# Patient Record
Sex: FEMALE | Race: WHITE | NOT HISPANIC OR LATINO | Employment: FULL TIME | URBAN - METROPOLITAN AREA
[De-identification: names, ages, dates, MRNs, and addresses within clinical notes are randomized per-mention and may not be internally consistent; named-entity substitution may affect disease eponyms.]

---

## 2024-02-10 ENCOUNTER — PHARMACY VISIT (OUTPATIENT)
Dept: PHARMACY | Facility: CLINIC | Age: 24
End: 2024-02-10

## 2024-02-10 ENCOUNTER — OFFICE VISIT (OUTPATIENT)
Dept: URGENT CARE | Facility: CLINIC | Age: 24
End: 2024-02-10
Payer: COMMERCIAL

## 2024-02-10 VITALS
SYSTOLIC BLOOD PRESSURE: 140 MMHG | DIASTOLIC BLOOD PRESSURE: 95 MMHG | BODY MASS INDEX: 53.92 KG/M2 | TEMPERATURE: 98.6 F | HEART RATE: 87 BPM | OXYGEN SATURATION: 96 % | WEIGHT: 293 LBS | HEIGHT: 62 IN

## 2024-02-10 DIAGNOSIS — J20.9 ACUTE BRONCHITIS, UNSPECIFIED ORGANISM: Primary | ICD-10-CM

## 2024-02-10 PROCEDURE — 99213 OFFICE O/P EST LOW 20 MIN: CPT | Mod: 25,27 | Performed by: NURSE PRACTITIONER

## 2024-02-10 PROCEDURE — RXMED WILLOW AMBULATORY MEDICATION CHARGE

## 2024-02-10 RX ORDER — ALBUTEROL SULFATE 90 UG/1
AEROSOL, METERED RESPIRATORY (INHALATION)
COMMUNITY
Start: 2024-01-12 | End: 2024-04-02 | Stop reason: ALTCHOICE

## 2024-02-10 RX ORDER — AZITHROMYCIN 250 MG/1
TABLET, FILM COATED ORAL
Qty: 6 TABLET | Refills: 0 | Status: SHIPPED | OUTPATIENT
Start: 2024-02-10 | End: 2024-02-15

## 2024-02-10 RX ORDER — PREDNISONE 10 MG/1
TABLET ORAL
Qty: 21 TABLET | Refills: 0 | Status: SHIPPED | OUTPATIENT
Start: 2024-02-10 | End: 2024-02-16

## 2024-02-10 NOTE — PROGRESS NOTES
23 y.o. female presents for evaluation of cough that has been present for the past 2 months. States she was treated for bronchitis x 2 which symptoms did improve each time but never completely resolved. Denies fever, chest pains, n/v/d, body aches or any other associated symptoms. Does have fatigue and SOB intermittently. No otc meds for symptoms. Does have albuterol inhaler that she has been using without spacer. Declines testing today.      Vitals:    02/10/24 0943   BP: (!) 140/95   Pulse: 87   Temp: 37 °C (98.6 °F)   SpO2: 96%       Allergies   Allergen Reactions    Penicillins Rash and Runny nose    Bee Pollen Itching    Horse Dander Standardized Allergenic Extract Itching    House Dust Mite Other    Sulfa (Sulfonamide Antibiotics) Rash       Medication Documentation Review Audit       Reviewed by Annalise Ribeiro MA (Medical Assistant) on 02/10/24 at 0948      Medication Order Taking? Sig Documenting Provider Last Dose Status   albuterol 90 mcg/actuation inhaler [id number] Yes  Historical Provider, MD  Active                    Past Medical History:   Diagnosis Date    Encounter for gynecological examination (general) (routine) without abnormal findings 01/13/2021    Women's annual routine gynecological examination    Other conditions influencing health status     Menstruation       Past Surgical History:   Procedure Laterality Date    OTHER SURGICAL HISTORY  12/12/2019    Tonsillectomy with adenoidectomy    OTHER SURGICAL HISTORY  01/26/2022    [place] tooth extraction    OTHER SURGICAL HISTORY  01/28/2021    Colonoscopy       ROS  See HPI    Physical Exam  Vitals and nursing note reviewed.   Constitutional:       General: She is not in acute distress.     Appearance: Normal appearance. She is normal weight. She is not ill-appearing, toxic-appearing or diaphoretic.   HENT:      Head: Normocephalic and atraumatic.      Right Ear: Tympanic membrane and ear canal normal.      Left Ear: Tympanic membrane and ear  canal normal.      Nose: Congestion present.      Mouth/Throat:      Mouth: Mucous membranes are moist.      Pharynx: Oropharynx is clear.   Eyes:      Extraocular Movements: Extraocular movements intact.      Conjunctiva/sclera: Conjunctivae normal.      Pupils: Pupils are equal, round, and reactive to light.   Cardiovascular:      Rate and Rhythm: Normal rate and regular rhythm.   Pulmonary:      Effort: Pulmonary effort is normal. No respiratory distress.      Breath sounds: Normal breath sounds. No stridor. No wheezing, rhonchi or rales.   Lymphadenopathy:      Cervical: No cervical adenopathy.   Skin:     General: Skin is warm and dry.      Capillary Refill: Capillary refill takes less than 2 seconds.   Neurological:      General: No focal deficit present.      Mental Status: She is alert and oriented to person, place, and time.   Psychiatric:         Mood and Affect: Mood normal.         Behavior: Behavior normal.         Assessment/Plan/MDM  Lynne was seen today for uri.  Diagnoses and all orders for this visit:  Acute bronchitis, unspecified organism (Primary)  -     azithromycin (Zithromax Z-Raciel) 250 mg tablet; Take 2 tablets (500 mg) on  Day 1,  followed by 1 tablet (250 mg) once daily on Days 2 through 5.  -     predniSONE (Deltasone) 10 mg tablet; Take 6 tablets (60 mg) by mouth once daily for 1 day, THEN 5 tablets (50 mg) once daily for 1 day, THEN 4 tablets (40 mg) once daily for 1 day, THEN 3 tablets (30 mg) once daily for 1 day, THEN 2 tablets (20 mg) once daily for 1 day, THEN 1 tablet (10 mg) once daily for 1 day.    Spacer provided for use with inhaler.  Patient's clinical presentation is otherwise unremarkable at this time. Patient is discharged with instructions to follow-up with primary care or seek emergency medical attention for worsening symptoms or any new concerns.        Richard Uribe, CNP  [facility]  [phone]

## 2024-04-02 ENCOUNTER — TELEPHONE (OUTPATIENT)
Dept: PRIMARY CARE | Facility: CLINIC | Age: 24
End: 2024-04-02

## 2024-04-02 ENCOUNTER — LAB (OUTPATIENT)
Dept: LAB | Facility: LAB | Age: 24
End: 2024-04-02
Payer: COMMERCIAL

## 2024-04-02 ENCOUNTER — OFFICE VISIT (OUTPATIENT)
Dept: PRIMARY CARE | Facility: CLINIC | Age: 24
End: 2024-04-02
Payer: COMMERCIAL

## 2024-04-02 VITALS
HEART RATE: 85 BPM | HEIGHT: 62 IN | BODY MASS INDEX: 53.92 KG/M2 | WEIGHT: 293 LBS | SYSTOLIC BLOOD PRESSURE: 126 MMHG | DIASTOLIC BLOOD PRESSURE: 82 MMHG

## 2024-04-02 DIAGNOSIS — E66.01 OBESITY, MORBID, BMI 50 OR HIGHER (MULTI): ICD-10-CM

## 2024-04-02 DIAGNOSIS — R63.5 WEIGHT GAIN: Primary | ICD-10-CM

## 2024-04-02 DIAGNOSIS — Z00.00 WELLNESS EXAMINATION: ICD-10-CM

## 2024-04-02 DIAGNOSIS — R63.5 WEIGHT GAIN: ICD-10-CM

## 2024-04-02 DIAGNOSIS — E66.01 OBESITY, MORBID, BMI 50 OR HIGHER (MULTI): Primary | ICD-10-CM

## 2024-04-02 LAB
ALBUMIN SERPL BCP-MCNC: 4.3 G/DL (ref 3.4–5)
ALP SERPL-CCNC: 75 U/L (ref 33–110)
ALT SERPL W P-5'-P-CCNC: 41 U/L (ref 7–45)
ANION GAP SERPL CALC-SCNC: 10 MMOL/L (ref 10–20)
AST SERPL W P-5'-P-CCNC: 29 U/L (ref 9–39)
BILIRUB SERPL-MCNC: 0.5 MG/DL (ref 0–1.2)
BUN SERPL-MCNC: 13 MG/DL (ref 6–23)
CALCIUM SERPL-MCNC: 9.1 MG/DL (ref 8.6–10.3)
CHLORIDE SERPL-SCNC: 105 MMOL/L (ref 98–107)
CHOLEST SERPL-MCNC: 238 MG/DL (ref 0–199)
CHOLESTEROL/HDL RATIO: 4.9
CO2 SERPL-SCNC: 28 MMOL/L (ref 21–32)
CREAT SERPL-MCNC: 0.7 MG/DL (ref 0.5–1.05)
EGFRCR SERPLBLD CKD-EPI 2021: >90 ML/MIN/1.73M*2
EST. AVERAGE GLUCOSE BLD GHB EST-MCNC: 103 MG/DL
FSH SERPL-ACNC: 6 IU/L
GLUCOSE SERPL-MCNC: 96 MG/DL (ref 74–99)
HBA1C MFR BLD: 5.2 %
HDLC SERPL-MCNC: 49 MG/DL
INSULIN SERPL-ACNC: 25 UIU/ML (ref 3–25)
LDLC SERPL CALC-MCNC: 166 MG/DL
LH SERPL-ACNC: 3.3 IU/L
NON HDL CHOLESTEROL: 189 MG/DL (ref 0–149)
POTASSIUM SERPL-SCNC: 4.3 MMOL/L (ref 3.5–5.3)
PROT SERPL-MCNC: 6.5 G/DL (ref 6.4–8.2)
SODIUM SERPL-SCNC: 139 MMOL/L (ref 136–145)
TRIGL SERPL-MCNC: 116 MG/DL (ref 0–149)
TSH SERPL-ACNC: 1.63 MIU/L (ref 0.44–3.98)
VLDL: 23 MG/DL (ref 0–40)

## 2024-04-02 PROCEDURE — 83001 ASSAY OF GONADOTROPIN (FSH): CPT

## 2024-04-02 PROCEDURE — 80053 COMPREHEN METABOLIC PANEL: CPT

## 2024-04-02 PROCEDURE — 84443 ASSAY THYROID STIM HORMONE: CPT

## 2024-04-02 PROCEDURE — 83036 HEMOGLOBIN GLYCOSYLATED A1C: CPT

## 2024-04-02 PROCEDURE — 83002 ASSAY OF GONADOTROPIN (LH): CPT

## 2024-04-02 PROCEDURE — 80061 LIPID PANEL: CPT

## 2024-04-02 PROCEDURE — RXMED WILLOW AMBULATORY MEDICATION CHARGE

## 2024-04-02 PROCEDURE — 99395 PREV VISIT EST AGE 18-39: CPT | Performed by: INTERNAL MEDICINE

## 2024-04-02 PROCEDURE — 36415 COLL VENOUS BLD VENIPUNCTURE: CPT

## 2024-04-02 PROCEDURE — 82626 DEHYDROEPIANDROSTERONE: CPT

## 2024-04-02 PROCEDURE — 1036F TOBACCO NON-USER: CPT | Performed by: INTERNAL MEDICINE

## 2024-04-02 PROCEDURE — 83525 ASSAY OF INSULIN: CPT

## 2024-04-02 RX ORDER — BUPROPION HYDROCHLORIDE 150 MG/1
150 TABLET ORAL EVERY MORNING
Qty: 30 TABLET | Refills: 5 | Status: SHIPPED | OUTPATIENT
Start: 2024-04-02 | End: 2024-09-29

## 2024-04-02 ASSESSMENT — ENCOUNTER SYMPTOMS
UNEXPECTED WEIGHT CHANGE: 1
WHEEZING: 0
ACTIVITY CHANGE: 0
BACK PAIN: 0
CHILLS: 0
ABDOMINAL DISTENTION: 0
SINUS PAIN: 0
COUGH: 0
VOMITING: 0
WEAKNESS: 0
NUMBNESS: 0
SORE THROAT: 0
FATIGUE: 0
SHORTNESS OF BREATH: 0
SINUS PRESSURE: 0
NAUSEA: 0
APPETITE CHANGE: 0
DIARRHEA: 0

## 2024-04-02 ASSESSMENT — PATIENT HEALTH QUESTIONNAIRE - PHQ9
2. FEELING DOWN, DEPRESSED OR HOPELESS: SEVERAL DAYS
1. LITTLE INTEREST OR PLEASURE IN DOING THINGS: SEVERAL DAYS
SUM OF ALL RESPONSES TO PHQ9 QUESTIONS 1 AND 2: 2

## 2024-04-02 NOTE — PROGRESS NOTES
"Subjective   Patient ID: Lynne Hernandez is a 23 y.o. female who presents for Annual Exam.  HPI  Patient is here today for annual exam.   Pt reports that she has been struggling to loose weight, her  has been doing the same diet that she is and he has lost 80 lbs and she has gained 10 lbs.   Seen Dr Beaulieu and tried things to get pregnant without success.   She said gyn told her that she does not have pcos.   Her periods are irregular and heavy.     Review of Systems   Constitutional:  Positive for unexpected weight change. Negative for activity change, appetite change, chills and fatigue.   HENT:  Negative for congestion, postnasal drip, sinus pressure, sinus pain and sore throat.    Respiratory:  Negative for cough, shortness of breath and wheezing.    Cardiovascular:  Negative for chest pain and leg swelling.   Gastrointestinal:  Negative for abdominal distention, diarrhea, nausea and vomiting.   Musculoskeletal:  Negative for back pain.   Neurological:  Negative for weakness and numbness.       Objective   /82   Pulse 85   Ht 1.58 m (5' 2.21\")   Wt 146 kg (321 lb)   BMI 58.32 kg/m²     Physical Exam  Constitutional:       General: She is not in acute distress.     Appearance: Normal appearance.   HENT:      Head: Normocephalic.      Nose: Nose normal.      Mouth/Throat:      Pharynx: No oropharyngeal exudate.   Eyes:      General:         Right eye: No discharge.         Left eye: No discharge.      Extraocular Movements: Extraocular movements intact.      Pupils: Pupils are equal, round, and reactive to light.   Cardiovascular:      Rate and Rhythm: Normal rate and regular rhythm.      Heart sounds: No murmur heard.     No gallop.   Pulmonary:      Effort: Pulmonary effort is normal. No respiratory distress.      Breath sounds: Normal breath sounds. No wheezing.   Musculoskeletal:         General: No swelling. Normal range of motion.   Skin:     General: Skin is warm and dry.      " Coloration: Skin is not jaundiced.   Neurological:      General: No focal deficit present.      Mental Status: She is alert and oriented to person, place, and time.      Cranial Nerves: No cranial nerve deficit.   Psychiatric:         Mood and Affect: Mood normal.         Behavior: Behavior normal.         Immunizations   Flu shot 2022   COVID declines   PNA --   Shingles --   RSV --     Pap 2020, 2023   Mammo --   DeXA --   Colon cancer --     Assessment/Plan   Problem List Items Addressed This Visit       Weight gain - Primary    Relevant Orders    TSH with reflex to Free T4 if abnormal    Insulin, random    DHEA    Hemoglobin A1C    FSH    Luteinizing hormone    Wellness examination    Relevant Orders    Comprehensive Metabolic Panel    Lipid Panel    Obesity, morbid, BMI 50 or higher (CMS/HCC)    Relevant Medications    tirzepatide, weight loss, (Zepbound) 2.5 mg/0.5 mL injection     Obesity, bmi 58  - will repeat labs and see If she has PCOS   - will send in zepbound and see if she has any coverage for weight loss meds   - discussed other oral options       Final diagnoses:   [R63.5] Weight gain   [Z00.00] Wellness examination   [E66.01] Obesity, morbid, BMI 50 or higher (CMS/HCC)

## 2024-04-06 LAB — DHEA SERPL-MCNC: 2 NG/ML (ref 1.33–7.78)

## 2024-04-13 ENCOUNTER — PHARMACY VISIT (OUTPATIENT)
Dept: PHARMACY | Facility: CLINIC | Age: 24
End: 2024-04-13
Payer: COMMERCIAL

## 2024-04-13 ENCOUNTER — HOSPITAL ENCOUNTER (EMERGENCY)
Facility: HOSPITAL | Age: 24
Discharge: HOME | End: 2024-04-13
Payer: COMMERCIAL

## 2024-04-13 VITALS
HEART RATE: 87 BPM | WEIGHT: 293 LBS | DIASTOLIC BLOOD PRESSURE: 88 MMHG | RESPIRATION RATE: 18 BRPM | SYSTOLIC BLOOD PRESSURE: 196 MMHG | BODY MASS INDEX: 53.92 KG/M2 | OXYGEN SATURATION: 97 % | TEMPERATURE: 98.6 F | HEIGHT: 62 IN

## 2024-04-13 DIAGNOSIS — L23.2 ALLERGIC CONTACT DERMATITIS DUE TO COSMETICS: Primary | ICD-10-CM

## 2024-04-13 PROCEDURE — 99283 EMERGENCY DEPT VISIT LOW MDM: CPT

## 2024-04-13 PROCEDURE — RXMED WILLOW AMBULATORY MEDICATION CHARGE

## 2024-04-13 PROCEDURE — 2500000004 HC RX 250 GENERAL PHARMACY W/ HCPCS (ALT 636 FOR OP/ED): Performed by: PHYSICIAN ASSISTANT

## 2024-04-13 PROCEDURE — 2500000001 HC RX 250 WO HCPCS SELF ADMINISTERED DRUGS (ALT 637 FOR MEDICARE OP): Performed by: PHYSICIAN ASSISTANT

## 2024-04-13 RX ORDER — DIPHENHYDRAMINE HCL 25 MG
25 TABLET ORAL EVERY 6 HOURS PRN
Qty: 30 TABLET | Refills: 0 | Status: SHIPPED | OUTPATIENT
Start: 2024-04-13

## 2024-04-13 RX ORDER — FAMOTIDINE 20 MG/1
40 TABLET, FILM COATED ORAL ONCE
Status: COMPLETED | OUTPATIENT
Start: 2024-04-13 | End: 2024-04-13

## 2024-04-13 RX ORDER — PREDNISONE 20 MG/1
40 TABLET ORAL ONCE
Status: COMPLETED | OUTPATIENT
Start: 2024-04-13 | End: 2024-04-13

## 2024-04-13 RX ORDER — PREDNISONE 10 MG/1
40 TABLET ORAL DAILY
Qty: 16 TABLET | Refills: 0 | Status: SHIPPED | OUTPATIENT
Start: 2024-04-13 | End: 2024-04-17

## 2024-04-13 RX ORDER — FAMOTIDINE 20 MG/1
40 TABLET, FILM COATED ORAL DAILY
Qty: 10 TABLET | Refills: 0 | Status: SHIPPED | OUTPATIENT
Start: 2024-04-13 | End: 2024-04-18

## 2024-04-13 RX ADMIN — FAMOTIDINE 40 MG: 20 TABLET, FILM COATED ORAL at 10:31

## 2024-04-13 RX ADMIN — PREDNISONE 40 MG: 20 TABLET ORAL at 10:31

## 2024-04-13 ASSESSMENT — PAIN DESCRIPTION - LOCATION: LOCATION: HEAD

## 2024-04-13 ASSESSMENT — PAIN DESCRIPTION - PAIN TYPE: TYPE: ACUTE PAIN

## 2024-04-13 ASSESSMENT — COLUMBIA-SUICIDE SEVERITY RATING SCALE - C-SSRS
1. IN THE PAST MONTH, HAVE YOU WISHED YOU WERE DEAD OR WISHED YOU COULD GO TO SLEEP AND NOT WAKE UP?: NO
2. HAVE YOU ACTUALLY HAD ANY THOUGHTS OF KILLING YOURSELF?: NO
6. HAVE YOU EVER DONE ANYTHING, STARTED TO DO ANYTHING, OR PREPARED TO DO ANYTHING TO END YOUR LIFE?: NO

## 2024-04-13 ASSESSMENT — PAIN - FUNCTIONAL ASSESSMENT: PAIN_FUNCTIONAL_ASSESSMENT: 0-10

## 2024-04-13 ASSESSMENT — PAIN SCALES - GENERAL: PAINLEVEL_OUTOF10: 2

## 2024-04-13 NOTE — ED PROVIDER NOTES
HPI   Chief Complaint   Patient presents with    Allergic Reaction     Allergic reaction to hair dye. Had hair done on Wednesday and now has facial swelling. Throat feels irritated, no signs of airway edema       Patient presents with scalp itching after using hair dye.  States she dyed her hair to 3 days ago.  Started developing itching and redness.  She denies any lesions.  She tried over-the-counter Benadryl with some relief.  States this happened prior with other hair dyes.  Patient denies any throat swelling or tongue swelling.  No trouble breathing.  No near syncope.  No chest pain or shortness of breath      History provided by:  Patient                      Aravind Coma Scale Score: 15                     Patient History   Past Medical History:   Diagnosis Date    Encounter for gynecological examination (general) (routine) without abnormal findings 01/13/2021    Women's annual routine gynecological examination    Other conditions influencing health status     Menstruation     Past Surgical History:   Procedure Laterality Date    OTHER SURGICAL HISTORY  12/12/2019    Tonsillectomy with adenoidectomy    OTHER SURGICAL HISTORY  01/26/2022    [place] tooth extraction    OTHER SURGICAL HISTORY  01/28/2021    Colonoscopy     No family history on file.  Social History     Tobacco Use    Smoking status: Never    Smokeless tobacco: Never   Substance Use Topics    Alcohol use: Not Currently    Drug use: Never       Physical Exam   ED Triage Vitals [04/13/24 1016]   Temperature Heart Rate Respirations BP   37 °C (98.6 °F) 87 18 (!) 196/88      Pulse Ox Temp Source Heart Rate Source Patient Position   97 % Temporal -- --      BP Location FiO2 (%)     -- --       Physical Exam  Vitals and nursing note reviewed.   Constitutional:       General: She is not in acute distress.     Appearance: Normal appearance. She is well-developed and well-groomed. She is obese. She is not ill-appearing or toxic-appearing.   HENT:       Head: Normocephalic.        Right Ear: Ear canal and external ear normal.      Left Ear: Ear canal and external ear normal.      Nose: Nose normal.      Mouth/Throat:      Lips: Pink. No lesions.      Mouth: Mucous membranes are moist.      Pharynx: No oropharyngeal exudate or posterior oropharyngeal erythema.   Eyes:      General: No scleral icterus.     Conjunctiva/sclera: Conjunctivae normal.   Cardiovascular:      Rate and Rhythm: Normal rate and regular rhythm.      Heart sounds: Normal heart sounds.   Pulmonary:      Effort: Pulmonary effort is normal.      Breath sounds: Normal breath sounds and air entry.   Skin:     Capillary Refill: Capillary refill takes less than 2 seconds.   Neurological:      General: No focal deficit present.      Mental Status: She is alert and oriented to person, place, and time.      Cranial Nerves: No cranial nerve deficit or facial asymmetry.      Sensory: No sensory deficit.      Motor: No weakness.      Gait: Gait normal.   Psychiatric:         Attention and Perception: Attention and perception normal.         Mood and Affect: Mood and affect normal.         Speech: Speech normal.         Behavior: Behavior normal. Behavior is cooperative.         Thought Content: Thought content normal.         Cognition and Memory: Cognition and memory normal.         Judgment: Judgment normal.         ED Course & MDM   Diagnoses as of 04/13/24 1031   Allergic contact dermatitis due to cosmetics       Medical Decision Making  Patient presents with scalp itching after using hair dye.  States she dyed her hair to 3 days ago.  Started developing itching and redness.  She denies any lesions.  She tried over-the-counter Benadryl with some relief.  States this happened prior with other hair dyes.  Patient denies any throat swelling or tongue swelling.  No trouble breathing.  No near syncope.  No chest pain or shortness of breath    Ddx: Contact dermatitis, irritant, other    Will treat patient with  steroids and Pepcid.  She took Benadryl prior to arrival.  Prescriptions for home for 4 additional days of prednisone with Pepcid and continue Benadryl.  Patient encouraged to discontinue using these types of dyes.  Patient encouraged to follow-up with primary care provider within the next 1 to 2 days for follow-up care.  Patient discharged home in improved stable condition    Problems Addressed:  Allergic contact dermatitis due to cosmetics: undiagnosed new problem with uncertain prognosis     Details: Treatment with steroids Pepcid and Benadryl.  First dose given in the ED    Risk  OTC drugs.  Prescription drug management.  Diagnosis or treatment significantly limited by social determinants of health.        Procedure  Procedures     Andreina Oliveira PA-C  04/13/24 1030

## 2024-04-15 ENCOUNTER — PHARMACY VISIT (OUTPATIENT)
Dept: PHARMACY | Facility: CLINIC | Age: 24
End: 2024-04-15
Payer: COMMERCIAL

## 2024-04-15 PROCEDURE — RXMED WILLOW AMBULATORY MEDICATION CHARGE

## 2024-04-15 RX ORDER — PREDNISONE 20 MG/1
TABLET ORAL
Qty: 16 TABLET | Refills: 0 | OUTPATIENT
Start: 2024-04-15

## 2024-04-15 RX ORDER — LORATADINE 10 MG/1
TABLET ORAL
Qty: 14 TABLET | Refills: 0 | OUTPATIENT
Start: 2024-04-15

## 2024-04-19 ENCOUNTER — LAB (OUTPATIENT)
Dept: LAB | Facility: LAB | Age: 24
End: 2024-04-19
Payer: COMMERCIAL

## 2024-04-19 DIAGNOSIS — J30.1 ALLERGIC RHINITIS DUE TO POLLEN: Primary | ICD-10-CM

## 2024-04-19 LAB — IGE SERPL-ACNC: 21 IU/ML (ref 0–214)

## 2024-04-19 PROCEDURE — 86003 ALLG SPEC IGE CRUDE XTRC EA: CPT

## 2024-04-19 PROCEDURE — 36415 COLL VENOUS BLD VENIPUNCTURE: CPT

## 2024-04-19 PROCEDURE — 82785 ASSAY OF IGE: CPT

## 2024-04-20 LAB
CLAM IGE QN: <0.1 KU/L
CODFISH IGE QN: <0.1 KU/L
CORN IGE QN: <0.1
EGG WHITE IGE QN: <0.1 KU/L
MILK IGE QN: 0.13 KU/L
PEANUT IGE QN: <0.1 KU/L
SCALLOP IGE QN: <0.1 KU/L
SESAME SEED IGE QN: <0.1 KU/L
SHRIMP IGE QN: 0.5 KU/L
SOYBEAN IGE QN: <0.1 KU/L
WALNUT IGE QN: <0.1 KU/L
WHEAT IGE QN: <0.1 KU/L

## 2024-04-25 ENCOUNTER — TELEMEDICINE (OUTPATIENT)
Dept: PRIMARY CARE | Facility: CLINIC | Age: 24
End: 2024-04-25
Payer: COMMERCIAL

## 2024-04-25 DIAGNOSIS — T78.40XS ALLERGIC REACTION, SEQUELA: Primary | ICD-10-CM

## 2024-04-25 PROBLEM — T78.40XA ALLERGIC REACTION: Status: ACTIVE | Noted: 2024-04-25

## 2024-04-25 PROCEDURE — 99213 OFFICE O/P EST LOW 20 MIN: CPT | Performed by: INTERNAL MEDICINE

## 2024-04-25 PROCEDURE — 1036F TOBACCO NON-USER: CPT | Performed by: INTERNAL MEDICINE

## 2024-04-25 ASSESSMENT — ENCOUNTER SYMPTOMS
SORE THROAT: 0
SINUS PRESSURE: 0
NUMBNESS: 0
FATIGUE: 0
WHEEZING: 0
ACTIVITY CHANGE: 0
WEAKNESS: 0
APPETITE CHANGE: 0
COUGH: 0
SHORTNESS OF BREATH: 0
SINUS PAIN: 0
ABDOMINAL DISTENTION: 0
VOMITING: 0
CHILLS: 0
BACK PAIN: 0
ROS SKIN COMMENTS: +ITCHING
NAUSEA: 0
DIARRHEA: 0

## 2024-04-25 NOTE — PROGRESS NOTES
Subjective   Patient ID: Lynne Hernandez is a 23 y.o. female who presents for No chief complaint on file..  HPI  Patient is here today for ED follow up    Pt states that she got her hair dyed, she was itching that NIGHT, she normally reacts to a lot of things, she then had swelling in the back of her head and then it extended into her face with eye swelling. She was given antihistamine, pepcid and prednisone. Two days later she had such severe selling that she felt like she was having trouble breathing and her eyes were really swollen. She went back to the ED and she was given a steroid injection and increased her steroid dosing. She states that it took the rest of that week for most of the swelling to resolve. Now having congestion still. She finished the prednisone. Rash has resolved.     Review of Systems   Constitutional:  Negative for activity change, appetite change, chills and fatigue.   HENT:  Negative for congestion, postnasal drip, sinus pressure, sinus pain and sore throat.    Respiratory:  Negative for cough, shortness of breath and wheezing.    Cardiovascular:  Negative for chest pain and leg swelling.   Gastrointestinal:  Negative for abdominal distention, diarrhea, nausea and vomiting.   Musculoskeletal:  Negative for back pain.   Skin:         +itching    Neurological:  Negative for weakness and numbness.       Objective   LMP 04/01/2024 (Approximate)     Physical Exam  No physical exam was completed due to virtual visit.     Assessment/Plan   Problem List Items Addressed This Visit       Allergic reaction - Primary     Allergic reaction to hair dye   - had significant swelling, angioedema and rash   - finished prednisone   - advised adding flonase to claritin   - did see ENT and discussing allergy shots     Final diagnoses:   [T78.40XS] Allergic reaction, sequela

## 2024-05-15 ENCOUNTER — APPOINTMENT (OUTPATIENT)
Dept: PRIMARY CARE | Facility: CLINIC | Age: 24
End: 2024-05-15
Payer: COMMERCIAL

## 2024-07-17 ENCOUNTER — APPOINTMENT (OUTPATIENT)
Dept: PRIMARY CARE | Facility: CLINIC | Age: 24
End: 2024-07-17
Payer: COMMERCIAL

## 2024-09-03 ENCOUNTER — PHARMACY VISIT (OUTPATIENT)
Dept: PHARMACY | Facility: CLINIC | Age: 24
End: 2024-09-03
Payer: COMMERCIAL

## 2024-09-03 ENCOUNTER — TELEPHONE (OUTPATIENT)
Dept: URGENT CARE | Facility: CLINIC | Age: 24
End: 2024-09-03

## 2024-09-03 ENCOUNTER — OFFICE VISIT (OUTPATIENT)
Dept: URGENT CARE | Facility: CLINIC | Age: 24
End: 2024-09-03
Payer: COMMERCIAL

## 2024-09-03 VITALS
HEART RATE: 86 BPM | WEIGHT: 293 LBS | HEIGHT: 62 IN | SYSTOLIC BLOOD PRESSURE: 125 MMHG | OXYGEN SATURATION: 97 % | TEMPERATURE: 97.1 F | BODY MASS INDEX: 53.92 KG/M2 | DIASTOLIC BLOOD PRESSURE: 88 MMHG | RESPIRATION RATE: 18 BRPM

## 2024-09-03 DIAGNOSIS — J06.9 ACUTE URI: Primary | ICD-10-CM

## 2024-09-03 DIAGNOSIS — B34.9 NONSPECIFIC SYNDROME SUGGESTIVE OF VIRAL ILLNESS: ICD-10-CM

## 2024-09-03 LAB
POC CORONAVIRUS 2019 BY PCR (COV19): NOT DETECTED
POC FLU A RESULT: NOT DETECTED
POC FLU B RESULT: NOT DETECTED
POC RSV PCR: NOT DETECTED

## 2024-09-03 PROCEDURE — RXMED WILLOW AMBULATORY MEDICATION CHARGE

## 2024-09-03 PROCEDURE — 99212 OFFICE O/P EST SF 10 MIN: CPT | Performed by: PHYSICIAN ASSISTANT

## 2024-09-03 RX ORDER — PROMETHAZINE HYDROCHLORIDE AND DEXTROMETHORPHAN HYDROBROMIDE 6.25; 15 MG/5ML; MG/5ML
5 SYRUP ORAL 4 TIMES DAILY PRN
Qty: 118 ML | Refills: 0 | Status: SHIPPED | OUTPATIENT
Start: 2024-09-03 | End: 2024-09-10

## 2024-09-03 NOTE — TELEPHONE ENCOUNTER
Result Communication    No results found from the In Basket message.    12:27 PM      Results were successfully communicated with the patient and they acknowledged their understanding.

## 2024-09-03 NOTE — LETTER
September 3, 2024     Patient: Lynne Hernandez   YOB: 2000   Date of Visit: 9/3/2024       To Whom It May Concern:    It is my medical opinion that Lynne Hernandez may return to work on 09/04/24 .    If you have any questions or concerns, please don't hesitate to call.         Sincerely,        Iggy Romero PA-C    CC: No Recipients

## 2024-09-03 NOTE — PROGRESS NOTES
[facility]   YUE NOTE:      Name: Lynne Hernandez, 24 y.o.    CSN:[id number]   MRN:[id number]    PCP: Ysabel Myers, DO    ALL:    Allergies   Allergen Reactions    Cephalexin Rash and Unknown    Penicillins Rash and Runny nose    Bee Pollen Itching    Horse Dander Standardized Allergenic Extract Itching    House Dust Mite Other    Sulfa (Sulfonamide Antibiotics) Rash       History:    Chief Complaint: URI (Body aches, diarrhea, chills, cough, congestion, ear discomfort, scratchy throat  X 2 days )    Encounter Date: 9/3/2024      HPI: The history was obtained from the patient. Lynne is a 24 y.o. female, who presents with a chief complaint of URI (Body aches, diarrhea, chills, cough, congestion, ear discomfort, scratchy throat  X 2 days )   .  She has been nauseous, having some diarrhea spells, modest sinus pressure and congestion, had 1 episode of vomiting that was nonbloody or nonbilious.  She has missed work and is looking for a work release.    PMHx:    Past Medical History:   Diagnosis Date    Encounter for gynecological examination (general) (routine) without abnormal findings 01/13/2021    Women's annual routine gynecological examination    Other conditions influencing health status     Menstruation              Current Outpatient Medications   Medication Sig Dispense Refill    promethazine-DM (Phenergan-DM) 6.25-15 mg/5 mL syrup Take 5 mL by mouth 4 times a day as needed for cough for up to 7 days. 118 mL 0     No current facility-administered medications for this visit.         PMSx:    Past Surgical History:   Procedure Laterality Date    OTHER SURGICAL HISTORY  12/12/2019    Tonsillectomy with adenoidectomy    OTHER SURGICAL HISTORY  01/26/2022    [place] tooth extraction    OTHER SURGICAL HISTORY  01/28/2021    Colonoscopy       Fam Hx: No family history on file.    SOC. Hx:     Social History     Socioeconomic History    Marital status: Single     Spouse name: Not on file     Number of children: Not on file    Years of education: Not on file    Highest education level: Not on file   Occupational History    Not on file   Tobacco Use    Smoking status: Never    Smokeless tobacco: Never   Substance and Sexual Activity    Alcohol use: Not Currently    Drug use: Never    Sexual activity: Not on file   Other Topics Concern    Not on file   Social History Narrative    Not on file     Social Determinants of Health     Financial Resource Strain: Not on file   Food Insecurity: Not on file   Transportation Needs: Not on file   Physical Activity: Not on file   Stress: Not on file   Social Connections: Not on file   Intimate Partner Violence: Not on file   Housing Stability: Not on file         Vitals:    09/03/24 1130   BP: 125/88   Pulse: 86   Resp: 18   Temp: 36.2 °C (97.1 °F)   SpO2: 97%     145 kg (320 lb)          Physical Exam  Vitals reviewed.   Constitutional:       Appearance: Normal appearance. She is normal weight.   HENT:      Head: Normocephalic and atraumatic.      Nose: Nose normal.      Mouth/Throat:      Mouth: Mucous membranes are moist.   Eyes:      Extraocular Movements: Extraocular movements intact.   Cardiovascular:      Rate and Rhythm: Normal rate and regular rhythm.   Pulmonary:      Effort: Pulmonary effort is normal.      Breath sounds: Normal breath sounds.   Abdominal:      General: Abdomen is flat.   Musculoskeletal:         General: Normal range of motion.      Cervical back: Normal range of motion and neck supple.   Skin:     General: Skin is warm.      Capillary Refill: Capillary refill takes less than 2 seconds.   Neurological:      Mental Status: She is alert and oriented to person, place, and time.   Psychiatric:         Behavior: Behavior normal.           LABORATORY @ RADIOLOGICAL IMAGING (if done):     Results for orders placed or performed in visit on 09/03/24 (from the past 24 hour(s))   POCT SARS-COV-2/FLU/RSV PCR SYMPTOMATIC manually resulted   Result  Value Ref Range    POC Coronavirus 2019, PCR Not Detected Not Detected    POC Flu A Result Not Detected Not Detected    POC Flu B Result Not Detected Not Detected    POC RSV PCR Not Detected Not Detected       ____________________________________________________________________    I did personally review Lynne's past medical history, surgical history, social history, as well as family history (when relevant).  In this case, I also oversaw the her drug management by reviewing her medication list, allergy list, as well as the medications that I prescribed during the UC course and/or recommended as an out-patient (including possible OTC medications such as acetaminophen, NSAIDs , etc).    After reviewing the items above, I did look at previous medical documentation, such as recent hospitalizations, office visits, and/or recent consultations with PCP/specialist.                          SDOH:   Another factor that I considered in Lynne's care was her Social Determinants of Health (SDOH). During this UC encounter, she did not have social determinants of health. Those SDOH influencing Lynne's care are: none      _____________________________________________________________________      UC COURSE/MEDICAL DECISION MAKING:    Lynne is a 24 y.o., who presents with a working diagnosis of   1. Acute URI    2. Nonspecific syndrome suggestive of viral illness     with a differential to include: Influenza, parainfluenza, rhinovirus, adenovirus, metapneumovirus, coronavirus, COVID-19, postnasal drip, strep pharyngitis, GERD, retropharyngeal abscess, tonsillitis, adenitis, seasonal allergies    Supportive care was recommended and will be provided patient with antitussives and antiemetic to overall address some of her symptoms.  She was agreeable this plan we discussed side effects potentially with the prescription treatment and lab results with the her over the phone.         Iggy Romero PA-C   Advanced Practice  Provider  [facility]

## 2024-09-14 ENCOUNTER — PHARMACY VISIT (OUTPATIENT)
Dept: PHARMACY | Facility: CLINIC | Age: 24
End: 2024-09-14
Payer: COMMERCIAL

## 2024-09-14 ENCOUNTER — APPOINTMENT (OUTPATIENT)
Dept: URGENT CARE | Facility: CLINIC | Age: 24
End: 2024-09-14
Payer: COMMERCIAL

## 2024-09-14 PROCEDURE — RXMED WILLOW AMBULATORY MEDICATION CHARGE

## 2024-09-14 RX ORDER — DIPHENHYDRAMINE HCL 25 MG
TABLET ORAL
Qty: 30 TABLET | Refills: 0 | OUTPATIENT
Start: 2024-09-14

## 2024-09-14 RX ORDER — PREDNISONE 50 MG/1
TABLET ORAL
Qty: 5 TABLET | Refills: 0 | OUTPATIENT
Start: 2024-09-14

## 2025-01-28 ENCOUNTER — APPOINTMENT (OUTPATIENT)
Dept: PRIMARY CARE | Facility: CLINIC | Age: 25
End: 2025-01-28
Payer: COMMERCIAL

## 2025-01-28 VITALS
SYSTOLIC BLOOD PRESSURE: 130 MMHG | HEART RATE: 74 BPM | WEIGHT: 293 LBS | HEIGHT: 63 IN | OXYGEN SATURATION: 99 % | BODY MASS INDEX: 51.91 KG/M2 | DIASTOLIC BLOOD PRESSURE: 82 MMHG

## 2025-01-28 DIAGNOSIS — N97.9 INFERTILITY, FEMALE: Primary | ICD-10-CM

## 2025-01-28 PROCEDURE — 1036F TOBACCO NON-USER: CPT

## 2025-01-28 PROCEDURE — 99213 OFFICE O/P EST LOW 20 MIN: CPT

## 2025-01-28 PROCEDURE — 3008F BODY MASS INDEX DOCD: CPT

## 2025-01-28 ASSESSMENT — ENCOUNTER SYMPTOMS
RESPIRATORY NEGATIVE: 1
CONSTITUTIONAL NEGATIVE: 1
CARDIOVASCULAR NEGATIVE: 1
GASTROINTESTINAL NEGATIVE: 1

## 2025-01-28 ASSESSMENT — PATIENT HEALTH QUESTIONNAIRE - PHQ9
1. LITTLE INTEREST OR PLEASURE IN DOING THINGS: NOT AT ALL
SUM OF ALL RESPONSES TO PHQ9 QUESTIONS 1 AND 2: 0
2. FEELING DOWN, DEPRESSED OR HOPELESS: NOT AT ALL

## 2025-01-28 NOTE — PROGRESS NOTES
"Subjective   Patient ID: Lynne Hernandez is a 24 y.o. female who presents for pt to [facility].    HPI   Here to establish.    GYN with [facility].    Review of Systems   Constitutional: Negative.    Respiratory: Negative.     Cardiovascular: Negative.    Gastrointestinal: Negative.        Objective   /82   Pulse 74   Ht 1.588 m (5' 2.5\")   Wt (!) 154 kg (340 lb)   SpO2 99%   BMI 61.20 kg/m²     Physical Exam  Constitutional:       General: She is not in acute distress.     Appearance: Normal appearance. She is not ill-appearing.   HENT:      Head: Normocephalic and atraumatic.   Eyes:      Extraocular Movements: Extraocular movements intact.      Conjunctiva/sclera: Conjunctivae normal.   Cardiovascular:      Rate and Rhythm: Normal rate.   Pulmonary:      Effort: Pulmonary effort is normal.   Abdominal:      General: There is no distension.   Musculoskeletal:         General: Normal range of motion.      Cervical back: Normal range of motion.   Skin:     General: Skin is warm and dry.   Neurological:      General: No focal deficit present.      Mental Status: She is alert and oriented to person, place, and time.   Psychiatric:         Mood and Affect: Mood normal.         Behavior: Behavior normal.         Thought Content: Thought content normal.         Judgment: Judgment normal.         Assessment/Plan        Discussed diet/exercise.  History of infertility, she will let me know if wanting referral.   Follow up 1 year or sooner prn.  "

## 2025-05-01 ENCOUNTER — TELEPHONE (OUTPATIENT)
Dept: PRIMARY CARE | Facility: CLINIC | Age: 25
End: 2025-05-01
Payer: COMMERCIAL

## 2025-05-01 ENCOUNTER — PHARMACY VISIT (OUTPATIENT)
Dept: PHARMACY | Facility: CLINIC | Age: 25
End: 2025-05-01
Payer: COMMERCIAL

## 2025-05-01 DIAGNOSIS — J06.9 UPPER RESPIRATORY TRACT INFECTION, UNSPECIFIED TYPE: Primary | ICD-10-CM

## 2025-05-01 DIAGNOSIS — R11.0 NAUSEA: ICD-10-CM

## 2025-05-01 PROCEDURE — RXMED WILLOW AMBULATORY MEDICATION CHARGE

## 2025-05-01 RX ORDER — ONDANSETRON 4 MG/1
4 TABLET, ORALLY DISINTEGRATING ORAL EVERY 8 HOURS PRN
Qty: 21 TABLET | Refills: 0 | Status: SHIPPED | OUTPATIENT
Start: 2025-05-01 | End: 2025-05-08

## 2025-05-01 RX ORDER — AZITHROMYCIN 250 MG/1
TABLET, FILM COATED ORAL
Qty: 6 TABLET | Refills: 0 | Status: SHIPPED | OUTPATIENT
Start: 2025-05-01 | End: 2025-05-06

## 2025-05-01 NOTE — TELEPHONE ENCOUNTER
GREGG IS HAVING FULL BODY ACHES, SORE THROAT, COUGH, HEADACHE, CONGESTION, FEVER, HARD TIME BREATHING, NOT WANTING TO EAT OR DRINKS, UPSET BELLY. WENT TO [facility] ED AND THEY SAID THEY CAN NOT GIVE ANYTHING CAUSE IT HAS NOT BEEN 10 DAYS. SHE HAS BEEN FEELING LIKE THIS SINCE THE WEEKEND. CAN SHE HAVE ANYTHING OR DO YA WANT HER SEEN HERE? THANK YOU.

## 2025-05-05 ENCOUNTER — PHARMACY VISIT (OUTPATIENT)
Dept: PHARMACY | Facility: CLINIC | Age: 25
End: 2025-05-05
Payer: COMMERCIAL

## 2025-05-05 DIAGNOSIS — J06.9 UPPER RESPIRATORY TRACT INFECTION, UNSPECIFIED TYPE: Primary | ICD-10-CM

## 2025-05-05 DIAGNOSIS — H10.9 CONJUNCTIVITIS, UNSPECIFIED CONJUNCTIVITIS TYPE, UNSPECIFIED LATERALITY: Primary | ICD-10-CM

## 2025-05-05 PROCEDURE — RXMED WILLOW AMBULATORY MEDICATION CHARGE

## 2025-05-05 RX ORDER — GENTAMICIN SULFATE 3 MG/ML
1 SOLUTION/ DROPS OPHTHALMIC EVERY 4 HOURS
Qty: 5 ML | Refills: 0 | Status: SHIPPED | OUTPATIENT
Start: 2025-05-05 | End: 2025-05-22

## 2025-05-05 RX ORDER — ALBUTEROL SULFATE 90 UG/1
2 INHALANT RESPIRATORY (INHALATION) EVERY 4 HOURS PRN
Qty: 6.7 G | Refills: 0 | Status: SHIPPED | OUTPATIENT
Start: 2025-05-05

## 2025-06-13 ENCOUNTER — HOSPITAL ENCOUNTER (OUTPATIENT)
Age: 25
Discharge: HOME | End: 2025-06-13
Payer: COMMERCIAL

## 2025-06-13 DIAGNOSIS — R53.83: Primary | ICD-10-CM

## 2025-06-13 LAB
ALBUMIN SERPL-MCNC: 4.2 G/DL (ref 3.5–5)
ALBUMIN/GLOB SERPL: 1.4 RATIO (ref 0.9–2.4)
ALP SERPL-CCNC: 87 U/L (ref 35–104)
ALT SERPL-CCNC: 61 U/L (ref ?–34)
ANION GAP SERPL CALC-SCNC: 12 MMOL/L (ref 5–15)
AST(SGOT): 41 U/L (ref ?–31)
BUN SERPL-MCNC: 10 MG/DL (ref 4–19)
BUN/CREAT SERPL: 13.9 RATIO (ref 10–20)
CALCIUM SERPL-MCNC: 9.2 MG/DL (ref 7.6–11)
CHLORIDE: 104 MMOL/L (ref 98–108)
CO2 BLDCV-SCNC: 21.9 MMOL/L (ref 21–32)
CREAT SERPL-MCNC: 0.69 MG/DL (ref 0.7–1.2)
ESTRADIOL: 57.3 PG/ML
FOLLICLE STIMULATING HORMONE: 6.3 MIU/ML
GLOBULIN: 3 G/DL (ref 2.2–4.2)
GLUCOSE SERPL-MCNC: 95 MG/DL (ref 70–99)
HBA1C MFR BLD: 5.6 % (ref ?–5.6)
POTASSIUM SPEC-MCNC: 4 MMOL/L (ref 3.3–5.1)
PROTEIN, TOTAL: 7.3 G/DL (ref 5.9–8.4)
SODIUM LEVEL: 137 MMOL/L (ref 133–145)
T4 FREE DIRECT: 0.9 NG/DL (ref 0.76–1.46)
TESTOSTERONE, TOTAL: 21.7 NG/DL (ref 9–58)
TOTAL BILIRUBIN: 0.28 MG/DL (ref 0–1.3)
TSH SERPL DL<=0.005 MIU/L-ACNC: 2 UIU/ML (ref 0.3–4.2)
VITAMIN D,25 HYDROXY: 15.2 NG/ML (ref 30–100)

## 2025-06-13 PROCEDURE — 84443 ASSAY THYROID STIM HORMONE: CPT

## 2025-06-13 PROCEDURE — 84439 ASSAY OF FREE THYROXINE: CPT

## 2025-06-13 PROCEDURE — 84146 ASSAY OF PROLACTIN: CPT

## 2025-06-13 PROCEDURE — 84403 ASSAY OF TOTAL TESTOSTERONE: CPT

## 2025-06-13 PROCEDURE — 83036 HEMOGLOBIN GLYCOSYLATED A1C: CPT

## 2025-06-13 PROCEDURE — 82627 DEHYDROEPIANDROSTERONE: CPT

## 2025-06-13 PROCEDURE — 82306 VITAMIN D 25 HYDROXY: CPT

## 2025-06-13 PROCEDURE — 80053 COMPREHEN METABOLIC PANEL: CPT

## 2025-06-13 PROCEDURE — 82670 ASSAY OF TOTAL ESTRADIOL: CPT

## 2025-06-13 PROCEDURE — 84144 ASSAY OF PROGESTERONE: CPT

## 2025-06-13 PROCEDURE — 36415 COLL VENOUS BLD VENIPUNCTURE: CPT

## 2025-06-13 PROCEDURE — 83001 ASSAY OF GONADOTROPIN (FSH): CPT

## 2025-06-14 LAB
DHEA-S SERPL-MCNC: 116 UG/DL (ref 84.8–378)
PROGESTERONE: 0.2 NG/ML
PROLACTIN SERPL-MCNC: 11.9 NG/ML (ref 4.8–33.4)

## 2025-06-23 ENCOUNTER — HOSPITAL ENCOUNTER (OUTPATIENT)
Dept: HOSPITAL 100 - OPUS | Age: 25
Discharge: HOME | End: 2025-06-23
Payer: COMMERCIAL

## 2025-06-23 DIAGNOSIS — N92.6: Primary | ICD-10-CM

## 2025-06-23 PROCEDURE — 76856 US EXAM PELVIC COMPLETE: CPT

## 2025-06-23 PROCEDURE — 76830 TRANSVAGINAL US NON-OB: CPT

## 2025-07-09 ENCOUNTER — HOSPITAL ENCOUNTER (OUTPATIENT)
Age: 25
Discharge: HOME | End: 2025-07-09
Payer: COMMERCIAL

## 2025-07-09 DIAGNOSIS — E55.9: ICD-10-CM

## 2025-07-09 DIAGNOSIS — Z31.9: Primary | ICD-10-CM

## 2025-07-09 LAB
ALBUMIN SERPL-MCNC: 4.1 G/DL (ref 3.5–5)
ALBUMIN/GLOB SERPL: 1.3 RATIO (ref 0.9–2.4)
ALP SERPL-CCNC: 91 U/L (ref 35–104)
ALT SERPL-CCNC: 55 U/L (ref ?–34)
ANION GAP SERPL CALC-SCNC: 11 MMOL/L (ref 5–15)
AST(SGOT): 39 U/L (ref ?–31)
BUN SERPL-MCNC: 10 MG/DL (ref 4–19)
BUN/CREAT SERPL: 12.6 RATIO (ref 10–20)
CALCIUM SERPL-MCNC: 9.1 MG/DL (ref 7.6–11)
CHLORIDE: 103 MMOL/L (ref 98–108)
CO2 BLDCV-SCNC: 24.2 MMOL/L (ref 21–32)
CREAT SERPL-MCNC: 0.79 MG/DL (ref 0.7–1.2)
GLOBULIN: 3.1 G/DL (ref 2.2–4.2)
GLUCOSE SERPL-MCNC: 118 MG/DL (ref 70–99)
POTASSIUM SPEC-MCNC: 4 MMOL/L (ref 3.3–5.1)
PROTEIN, TOTAL: 7.2 G/DL (ref 5.9–8.4)
REPROSOURCE: (no result)
SODIUM LEVEL: 138 MMOL/L (ref 133–145)
TOTAL BILIRUBIN: 0.42 MG/DL (ref 0–1.3)
VITAMIN D,25 HYDROXY: 26 NG/ML (ref 30–100)

## 2025-07-09 PROCEDURE — 80053 COMPREHEN METABOLIC PANEL: CPT

## 2025-07-09 PROCEDURE — 36415 COLL VENOUS BLD VENIPUNCTURE: CPT

## 2025-07-09 PROCEDURE — 82306 VITAMIN D 25 HYDROXY: CPT

## 2026-01-29 ENCOUNTER — APPOINTMENT (OUTPATIENT)
Dept: PRIMARY CARE | Facility: CLINIC | Age: 26
End: 2026-01-29
Payer: COMMERCIAL